# Patient Record
Sex: FEMALE | Race: WHITE | NOT HISPANIC OR LATINO | Employment: OTHER | URBAN - NONMETROPOLITAN AREA
[De-identification: names, ages, dates, MRNs, and addresses within clinical notes are randomized per-mention and may not be internally consistent; named-entity substitution may affect disease eponyms.]

---

## 2021-02-25 ENCOUNTER — IMMUNIZATION (OUTPATIENT)
Dept: OBSTETRICS AND GYNECOLOGY | Facility: CLINIC | Age: 81
End: 2021-02-25
Payer: MEDICARE

## 2021-02-25 DIAGNOSIS — Z23 NEED FOR VACCINATION: Primary | ICD-10-CM

## 2021-02-25 PROCEDURE — 0001A COVID-19, MRNA, LNP-S, PF, 30 MCG/0.3 ML DOSE VACCINE: ICD-10-PCS | Mod: CV19,,, | Performed by: ANESTHESIOLOGY

## 2021-02-25 PROCEDURE — 91300 COVID-19, MRNA, LNP-S, PF, 30 MCG/0.3 ML DOSE VACCINE: CPT | Mod: ,,, | Performed by: ANESTHESIOLOGY

## 2021-02-25 PROCEDURE — 91300 COVID-19, MRNA, LNP-S, PF, 30 MCG/0.3 ML DOSE VACCINE: ICD-10-PCS | Mod: ,,, | Performed by: ANESTHESIOLOGY

## 2021-02-25 PROCEDURE — 0001A COVID-19, MRNA, LNP-S, PF, 30 MCG/0.3 ML DOSE VACCINE: CPT | Mod: CV19,,, | Performed by: ANESTHESIOLOGY

## 2021-03-18 ENCOUNTER — IMMUNIZATION (OUTPATIENT)
Dept: OBSTETRICS AND GYNECOLOGY | Facility: CLINIC | Age: 81
End: 2021-03-18
Payer: MEDICARE

## 2021-03-18 DIAGNOSIS — Z23 NEED FOR VACCINATION: Primary | ICD-10-CM

## 2021-03-18 PROCEDURE — 0002A COVID-19, MRNA, LNP-S, PF, 30 MCG/0.3 ML DOSE VACCINE: CPT | Mod: PBBFAC

## 2021-03-18 PROCEDURE — 91300 COVID-19, MRNA, LNP-S, PF, 30 MCG/0.3 ML DOSE VACCINE: CPT | Mod: PBBFAC

## 2021-09-30 ENCOUNTER — IMMUNIZATION (OUTPATIENT)
Dept: OBSTETRICS AND GYNECOLOGY | Facility: CLINIC | Age: 81
End: 2021-09-30
Payer: MEDICARE

## 2021-09-30 DIAGNOSIS — Z23 NEED FOR VACCINATION: Primary | ICD-10-CM

## 2021-09-30 PROCEDURE — 91300 COVID-19, MRNA, LNP-S, PF, 30 MCG/0.3 ML DOSE VACCINE: CPT | Mod: PBBFAC

## 2022-05-04 DIAGNOSIS — Z95.828 PRESENCE OF PERMANENT CENTRAL VENOUS CATHETER: Primary | ICD-10-CM

## 2022-05-12 PROBLEM — Z95.828: Status: ACTIVE | Noted: 2022-05-12

## 2022-07-14 VITALS — BODY MASS INDEX: 27.49 KG/M2 | HEIGHT: 65 IN | WEIGHT: 165 LBS

## 2022-07-17 PROBLEM — H25.011 CORTICAL AGE-RELATED CATARACT, RIGHT EYE: Status: ACTIVE | Noted: 2022-07-17

## 2022-07-17 PROBLEM — H25.041 POSTERIOR SUBCAPSULAR AGE-RELATED CATARACT, RIGHT EYE: Status: ACTIVE | Noted: 2022-07-17

## 2022-07-17 PROBLEM — H25.11 AGE-RELATED NUCLEAR CATARACT, RIGHT EYE: Status: ACTIVE | Noted: 2022-07-17

## 2022-07-18 ENCOUNTER — ANESTHESIA EVENT (OUTPATIENT)
Dept: SURGERY | Facility: HOSPITAL | Age: 82
End: 2022-07-18
Payer: MEDICARE

## 2022-07-18 ENCOUNTER — HOSPITAL ENCOUNTER (OUTPATIENT)
Dept: PREADMISSION TESTING | Facility: HOSPITAL | Age: 82
Discharge: HOME | End: 2022-07-18
Attending: OPHTHALMOLOGY
Payer: MEDICARE

## 2022-07-18 NOTE — ANESTHESIA PREPROCEDURE EVALUATION
07/18/2022  Sherri Camp is a 81 y.o., female.      Pre-op Assessment    I have reviewed the Patient Summary Reports.    I have reviewed the NPO Status.   I have reviewed the Medications.     Review of Systems  Anesthesia Hx:  No problems with previous Anesthesia  Denies Family Hx of Anesthesia complications.   Denies Personal Hx of Anesthesia complications.   Social:  Non-Smoker    Hematology/Oncology:         -- Cancer in past history: Breast chemotherapy and surgery    Cardiovascular:   Hypertension, well controlled    Pulmonary:  Pulmonary Normal    Renal/:  Renal/ Normal     Hepatic/GI:  Hepatic/GI Normal    Neurological:  Neurology Normal    Endocrine:  Endocrine Normal        Physical Exam  General: Well nourished    Airway:  Mallampati: I / I  Mouth Opening: Normal  TM Distance: Normal  Tongue: Normal  Neck ROM: Normal ROM    Dental:  Dentures    Chest/Lungs:  Clear to auscultation    Heart:  Rate: Normal  Rhythm: Regular Rhythm  Sounds: Normal        Anesthesia Plan  Type of Anesthesia, risks & benefits discussed:    Anesthesia Type: MAC  Intra-op Monitoring Plan: Standard ASA Monitors  Post Op Pain Control Plan: multimodal analgesia  Induction:  IV  Airway Plan: Direct  Informed Consent: Informed consent signed with the Patient and all parties understand the risks and agree with anesthesia plan.  All questions answered.   ASA Score: 3  Day of Surgery Review of History & Physical: I have interviewed and examined the patient. I have reviewed the patient's H&P dated: There are no significant changes.     Ready For Surgery From Anesthesia Perspective.     .

## 2022-07-19 ENCOUNTER — ANESTHESIA (OUTPATIENT)
Dept: SURGERY | Facility: HOSPITAL | Age: 82
End: 2022-07-19
Payer: MEDICARE

## 2022-07-19 ENCOUNTER — HOSPITAL ENCOUNTER (OUTPATIENT)
Facility: HOSPITAL | Age: 82
Discharge: HOME | End: 2022-07-19
Attending: OPHTHALMOLOGY | Admitting: OPHTHALMOLOGY
Payer: MEDICARE

## 2022-07-19 VITALS
RESPIRATION RATE: 20 BRPM | OXYGEN SATURATION: 95 % | DIASTOLIC BLOOD PRESSURE: 66 MMHG | HEART RATE: 57 BPM | TEMPERATURE: 98 F | SYSTOLIC BLOOD PRESSURE: 129 MMHG

## 2022-07-19 DIAGNOSIS — H25.11 AGE-RELATED NUCLEAR CATARACT, RIGHT EYE: ICD-10-CM

## 2022-07-19 PROBLEM — H25.011 CORTICAL AGE-RELATED CATARACT, RIGHT EYE: Status: RESOLVED | Noted: 2022-07-17 | Resolved: 2022-07-19

## 2022-07-19 PROBLEM — H25.041 POSTERIOR SUBCAPSULAR AGE-RELATED CATARACT, RIGHT EYE: Status: RESOLVED | Noted: 2022-07-17 | Resolved: 2022-07-19

## 2022-07-19 PROCEDURE — 37000009 HC ANESTHESIA EA ADD 15 MINS: Performed by: OPHTHALMOLOGY

## 2022-07-19 PROCEDURE — 71000015 HC POSTOP RECOV 1ST HR: Performed by: OPHTHALMOLOGY

## 2022-07-19 PROCEDURE — 25000242 PHARM REV CODE 250 ALT 637 W/ HCPCS: Performed by: ANESTHESIOLOGY

## 2022-07-19 PROCEDURE — 37000008 HC ANESTHESIA 1ST 15 MINUTES: Performed by: OPHTHALMOLOGY

## 2022-07-19 PROCEDURE — 36000706: Performed by: OPHTHALMOLOGY

## 2022-07-19 PROCEDURE — 36000707: Performed by: OPHTHALMOLOGY

## 2022-07-19 PROCEDURE — 25000003 PHARM REV CODE 250: Performed by: OPHTHALMOLOGY

## 2022-07-19 PROCEDURE — V2632 POST CHMBR INTRAOCULAR LENS: HCPCS | Performed by: OPHTHALMOLOGY

## 2022-07-19 PROCEDURE — 63600175 PHARM REV CODE 636 W HCPCS: Performed by: OPHTHALMOLOGY

## 2022-07-19 DEVICE — TECNIS 1-PC CLEAR MONO 6.0MM 20.0D
Type: IMPLANTABLE DEVICE | Site: EYE | Status: FUNCTIONAL
Brand: TECNIS IOL

## 2022-07-19 RX ORDER — MIDAZOLAM HCL 2 MG/ML
4 SYRUP ORAL ONCE
Status: COMPLETED | OUTPATIENT
Start: 2022-07-19 | End: 2022-07-19

## 2022-07-19 RX ORDER — MOXIFLOXACIN 5 MG/ML
1 SOLUTION/ DROPS OPHTHALMIC
Status: COMPLETED | OUTPATIENT
Start: 2022-07-19 | End: 2022-07-19

## 2022-07-19 RX ORDER — LIDOCAINE HYDROCHLORIDE 10 MG/ML
0.5 INJECTION, SOLUTION EPIDURAL; INFILTRATION; INTRACAUDAL; PERINEURAL ONCE
Status: ACTIVE | OUTPATIENT
Start: 2022-07-19

## 2022-07-19 RX ORDER — PROPARACAINE HYDROCHLORIDE 5 MG/ML
1 SOLUTION/ DROPS OPHTHALMIC
Status: COMPLETED | OUTPATIENT
Start: 2022-07-19 | End: 2022-07-19

## 2022-07-19 RX ORDER — MOXIFLOXACIN 5 MG/ML
SOLUTION/ DROPS OPHTHALMIC
Status: DISCONTINUED | OUTPATIENT
Start: 2022-07-19 | End: 2022-07-19 | Stop reason: HOSPADM

## 2022-07-19 RX ORDER — LIDOCAINE HYDROCHLORIDE 10 MG/ML
INJECTION, SOLUTION EPIDURAL; INFILTRATION; INTRACAUDAL; PERINEURAL
Status: DISCONTINUED | OUTPATIENT
Start: 2022-07-19 | End: 2022-07-19 | Stop reason: HOSPADM

## 2022-07-19 RX ORDER — TROPICAMIDE 10 MG/ML
1 SOLUTION/ DROPS OPHTHALMIC
Status: COMPLETED | OUTPATIENT
Start: 2022-07-19 | End: 2022-07-19

## 2022-07-19 RX ORDER — PHENYLEPHRINE HYDROCHLORIDE 25 MG/ML
1 SOLUTION/ DROPS OPHTHALMIC
Status: COMPLETED | OUTPATIENT
Start: 2022-07-19 | End: 2022-07-19

## 2022-07-19 RX ORDER — PREDNISOLONE ACETATE 10 MG/ML
SUSPENSION/ DROPS OPHTHALMIC
Status: DISCONTINUED | OUTPATIENT
Start: 2022-07-19 | End: 2022-07-19 | Stop reason: HOSPADM

## 2022-07-19 RX ORDER — BROMFENAC SODIUM 0.81 MG/ML
SOLUTION/ DROPS OPHTHALMIC
Status: DISCONTINUED | OUTPATIENT
Start: 2022-07-19 | End: 2022-07-19 | Stop reason: HOSPADM

## 2022-07-19 RX ORDER — PROPARACAINE HYDROCHLORIDE 5 MG/ML
SOLUTION/ DROPS OPHTHALMIC
Status: DISCONTINUED | OUTPATIENT
Start: 2022-07-19 | End: 2022-07-19 | Stop reason: HOSPADM

## 2022-07-19 RX ORDER — LIDOCAINE HYDROCHLORIDE 10 MG/ML
1 INJECTION, SOLUTION EPIDURAL; INFILTRATION; INTRACAUDAL; PERINEURAL ONCE
Status: DISPENSED | OUTPATIENT
Start: 2022-07-19

## 2022-07-19 RX ADMIN — PROPARACAINE HYDROCHLORIDE 1 DROP: 5 SOLUTION/ DROPS OPHTHALMIC at 08:07

## 2022-07-19 RX ADMIN — PHENYLEPHRINE HYDROCHLORIDE 1 DROP: 25 SOLUTION/ DROPS OPHTHALMIC at 08:07

## 2022-07-19 RX ADMIN — MOXIFLOXACIN 1 DROP: 5 SOLUTION/ DROPS OPHTHALMIC at 08:07

## 2022-07-19 RX ADMIN — TROPICAMIDE 1 DROP: 10 SOLUTION/ DROPS OPHTHALMIC at 08:07

## 2022-07-19 RX ADMIN — MIDAZOLAM HYDROCHLORIDE 4 MG: 2 SYRUP ORAL at 08:07

## 2022-07-19 NOTE — TRANSFER OF CARE
Anesthesia Transfer of Care Note    Patient: Sherri Camp    Procedure(s) Performed: Procedure(s) (LRB):  PHACOEMULSIFICATION, CATARACT, WITH IOL INSERTION (Right)    Patient location: Other: OR C    Anesthesia Type: MAC    Transport from OR: Transported from OR on room air with adequate spontaneous ventilation    Post pain: adequate analgesia    Post assessment: no apparent anesthetic complications    Post vital signs: stable    Level of consciousness: awake    Nausea/Vomiting: no nausea/vomiting    Complications: none    Transfer of care protocol was followed      Last vitals:   /80  HR 50  RR 16  T 36.7  SPO2 99

## 2022-07-19 NOTE — ANESTHESIA POSTPROCEDURE EVALUATION
Anesthesia Post Evaluation    Patient: Sherri Camp    Procedure(s) Performed: Procedure(s) (LRB):  PHACOEMULSIFICATION, CATARACT, WITH IOL INSERTION (Right)    Final Anesthesia Type: MAC      Patient location during evaluation: OPS  Patient participation: Yes- Able to Participate  Level of consciousness: awake  Post-procedure vital signs: reviewed and stable  Pain management: adequate  Airway patency: patent    PONV status at discharge: No PONV  Anesthetic complications: no      Cardiovascular status: blood pressure returned to baseline  Respiratory status: spontaneous ventilation  Hydration status: euvolemic  Follow-up not needed.          Vitals Value Taken Time   /66 07/19/22 1009   Temp 36.6 °C (97.9 °F) 07/19/22 1009   Pulse 57 07/19/22 1009   Resp 20 07/19/22 1009   SpO2 95 % 07/19/22 1009         No case tracking events are documented in the log.      Pain/Hamzah Score: Hamzah Score: 10 (7/19/2022 10:09 AM)

## 2022-07-19 NOTE — DISCHARGE INSTRUCTIONS
-RELAX AT HOME FOR THE REST OF THE DAY. YOU MAY EAT ANYTHING YOU LIKE.   -PLAN TO SEE DR LANDA TOMORROW AT THE OFFICE. BRING ALL EYE DROPS WITH YOU TO THIS APPOINTMENT.    -PUT EYE DROPS IN THE AFFECTED EYE AS PER DR LANDA'S EYE DROP SCHEDULED. -USE IN ANY ORDER, WAIT 5 MINUTES BETWEEN DROPS.  -REMOVE EYE SHIELD WHILE PUTTING EYE DROPS IN.    -DO NOT RUB YOUR EYE!!  -DO NOT EXERT YOURSELF - NO HEAVY LIFTING, RUNNING OR SWIMMING FOR 1 WEEK.  -YOU MAY SHOWER, BUT DON'T ALLOW WATER INTO YOUR EYE FOR 1 WEEK.  -WEAR PROTECTIVE SUNGLASSES DURING THE DAY AND AN EYE SHIELD AT NIGHT FOR 1 WEEK.    NO DRINKING ALCOHOL OR DRIVING FOR 24 HOURS.    -IF YOU HAVE PAIN, REDNESS OR DECREASED VISION, CALL DR LANDA'S OFFICE -[phone] OR IF AFTER HOURS CALL [phone].

## 2022-07-19 NOTE — DISCHARGE SUMMARY
[facility]  Brief Discharge Note    Patient Name:  Sherri Camp   MRN:  [id number]    :  1940   Admission Date:  2022    Discharge Date:  2022   Attending Physician: Cristhian Kuo MD     Procedure:  PHACOEMULSIFICATION, CATARACT (Right)    OUTCOME: Patient tolerated procedure well without complication and is now ready for discharge.    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                                                       Only remove eye shield to apply eye drops.                                                       Follow the post-op eye instructions given from the clinic.

## 2022-07-19 NOTE — OP NOTE
[facility]  Operative Note    Date:  2022     Patient:  Sherri Camp   MRN:  [id number]   :  1940    Surgeon:  Cristhian uKo MD    PREOPERATIVE DIAGNOSIS:  Visually significant age-related nuclear, cortical, and posterior subcapsular cataract, right eye.    POSTOPERATIVE DIAGNOSIS:  Visually significant age-related nuclear, cortical, and posterior subcapsular cataract, right eye.    PROCEDURE:  Phacoemulsification of cataract with posterior chamber intraocular lens implant, right eye.    ANESTHESIA:  Topical with MAC.      COMPLICATIONS:  None.    ESTIMATED BLOOD LOSS:  Minimal.    PROCEDURE IN DETAIL:  The patient presented to our clinic complaining of increasing difficulties with activities of daily living due to a visually significant cataract in the right eye.  After risks, benefits, and alternatives were explained to the patient, the patient agreed to proceed with the above procedure.  The patient was brought to the operating room and received topical anesthetic to the right eye and was then prepped and draped in the usual sterile fashion.  The right eye was first entered at 11:00 o'clock paracentesis site followed by intracameral lidocaine, and Viscoat.  The primary surgical site was then created at 8:30 o'clock followed by continuous capsulotomy, hydrodissection, and phacoemulsification of the cataract.  Residual cortical material was removed using the automated irrigation-aspiration technique.  Provisc was injected into the posterior chamber and an TAHIR model ZCB00 20.0 diopter lens was placed in the bag without difficulty.  Residual Provisc was removed using the automated irrigation-aspiration technique.  The eye was re-pressurized using BSS solution, and both the paracentesis and primary surgical site were demonstrated to be watertight at the end of the case with Weck-Vivi manipulation.  Vigamox, Pred Forte, and Prolensa were placed in the eye followed by placement of a Dao shield.   The patient tolerated the procedure well and was taken to the recovery room in good and stable condition.  The patient was instructed to refrain from any heavy lifting, bending, stooping, or straining activities and to follow-up in the morning for routine post-operative care with Dr. Cristhian Kuo.

## 2022-09-16 ENCOUNTER — ANESTHESIA EVENT (OUTPATIENT)
Dept: SURGERY | Facility: HOSPITAL | Age: 82
End: 2022-09-16
Payer: MEDICARE

## 2022-09-16 PROBLEM — H25.042 POSTERIOR SUBCAPSULAR AGE-RELATED CATARACT, LEFT EYE: Status: ACTIVE | Noted: 2022-09-16

## 2022-09-16 PROBLEM — H25.12 AGE-RELATED NUCLEAR CATARACT, LEFT EYE: Status: ACTIVE | Noted: 2022-09-16

## 2022-09-16 PROBLEM — H25.012 CORTICAL AGE-RELATED CATARACT, LEFT EYE: Status: ACTIVE | Noted: 2022-09-16

## 2022-09-16 NOTE — ANESTHESIA PREPROCEDURE EVALUATION
09/16/2022  Sherri Camp is a 82 y.o., female.      Pre-op Assessment    I have reviewed the Patient Summary Reports.    I have reviewed the NPO Status.   I have reviewed the Medications.     Review of Systems  Anesthesia Hx:  No problems with previous Anesthesia  Denies Family Hx of Anesthesia complications.   Denies Personal Hx of Anesthesia complications.   Social:  Non-Smoker    Cardiovascular:   Hypertension, well controlled CAD asymptomatic     Pulmonary:  Pulmonary Normal    Renal/:  Renal/ Normal     Hepatic/GI:  Hepatic/GI Normal    Neurological:  Neurology Normal    Endocrine:  Endocrine Normal        Physical Exam  General: Well nourished    Airway:  Mallampati: II / II  Mouth Opening: Normal  TM Distance: Normal  Tongue: Normal  Neck ROM: Normal ROM    Dental:  Dentures    Chest/Lungs:  Clear to auscultation    Heart:  Rate: Normal  Rhythm: Regular Rhythm  Sounds: Normal        Anesthesia Plan  Type of Anesthesia, risks & benefits discussed:    Anesthesia Type: MAC  Intra-op Monitoring Plan: Standard ASA Monitors  Post Op Pain Control Plan: multimodal analgesia  Induction:  IV  Airway Plan: Direct  Informed Consent: Informed consent signed with the Patient and all parties understand the risks and agree with anesthesia plan.  All questions answered.   ASA Score: 3  Day of Surgery Review of History & Physical: I have interviewed and examined the patient. I have reviewed the patient's H&P dated: There are no significant changes.     Ready For Surgery From Anesthesia Perspective.     .

## 2022-09-19 ENCOUNTER — ANESTHESIA (OUTPATIENT)
Dept: SURGERY | Facility: HOSPITAL | Age: 82
End: 2022-09-19
Payer: MEDICARE

## 2022-09-19 ENCOUNTER — HOSPITAL ENCOUNTER (OUTPATIENT)
Facility: HOSPITAL | Age: 82
Discharge: HOME | End: 2022-09-19
Attending: OPHTHALMOLOGY | Admitting: OPHTHALMOLOGY
Payer: MEDICARE

## 2022-09-19 DIAGNOSIS — H25.12 AGE-RELATED NUCLEAR CATARACT, LEFT EYE: ICD-10-CM

## 2022-09-19 PROBLEM — H25.012 CORTICAL AGE-RELATED CATARACT, LEFT EYE: Status: RESOLVED | Noted: 2022-09-16 | Resolved: 2022-09-19

## 2022-09-19 PROBLEM — H25.042 POSTERIOR SUBCAPSULAR AGE-RELATED CATARACT, LEFT EYE: Status: RESOLVED | Noted: 2022-09-16 | Resolved: 2022-09-19

## 2022-09-19 PROCEDURE — 36000707: Performed by: OPHTHALMOLOGY

## 2022-09-19 PROCEDURE — 25000242 PHARM REV CODE 250 ALT 637 W/ HCPCS: Performed by: ANESTHESIOLOGY

## 2022-09-19 PROCEDURE — 37000008 HC ANESTHESIA 1ST 15 MINUTES: Performed by: OPHTHALMOLOGY

## 2022-09-19 PROCEDURE — 37000009 HC ANESTHESIA EA ADD 15 MINS: Performed by: OPHTHALMOLOGY

## 2022-09-19 PROCEDURE — 36000706: Performed by: OPHTHALMOLOGY

## 2022-09-19 PROCEDURE — 63600175 PHARM REV CODE 636 W HCPCS: Performed by: OPHTHALMOLOGY

## 2022-09-19 PROCEDURE — 71000015 HC POSTOP RECOV 1ST HR: Performed by: OPHTHALMOLOGY

## 2022-09-19 PROCEDURE — 25000003 PHARM REV CODE 250: Performed by: OPHTHALMOLOGY

## 2022-09-19 PROCEDURE — V2632 POST CHMBR INTRAOCULAR LENS: HCPCS | Performed by: OPHTHALMOLOGY

## 2022-09-19 DEVICE — TECNIS 1-PC CLEAR MONO 6.0MM 20.0D
Type: IMPLANTABLE DEVICE | Site: EYE | Status: FUNCTIONAL
Brand: TECNIS IOL

## 2022-09-19 RX ORDER — PROPARACAINE HYDROCHLORIDE 5 MG/ML
1 SOLUTION/ DROPS OPHTHALMIC
Status: COMPLETED | OUTPATIENT
Start: 2022-09-19 | End: 2022-09-19

## 2022-09-19 RX ORDER — BROMFENAC SODIUM 0.81 MG/ML
SOLUTION/ DROPS OPHTHALMIC
Status: DISCONTINUED | OUTPATIENT
Start: 2022-09-19 | End: 2022-09-19 | Stop reason: HOSPADM

## 2022-09-19 RX ORDER — LIDOCAINE HYDROCHLORIDE 10 MG/ML
INJECTION, SOLUTION EPIDURAL; INFILTRATION; INTRACAUDAL; PERINEURAL
Status: DISCONTINUED | OUTPATIENT
Start: 2022-09-19 | End: 2022-09-19 | Stop reason: HOSPADM

## 2022-09-19 RX ORDER — PREDNISOLONE ACETATE 10 MG/ML
SUSPENSION/ DROPS OPHTHALMIC
Status: DISCONTINUED | OUTPATIENT
Start: 2022-09-19 | End: 2022-09-19 | Stop reason: HOSPADM

## 2022-09-19 RX ORDER — LIDOCAINE HYDROCHLORIDE 10 MG/ML
0.5 INJECTION, SOLUTION EPIDURAL; INFILTRATION; INTRACAUDAL; PERINEURAL ONCE
Status: DISCONTINUED | OUTPATIENT
Start: 2022-09-19 | End: 2022-09-19 | Stop reason: HOSPADM

## 2022-09-19 RX ORDER — PHENYLEPHRINE HYDROCHLORIDE 25 MG/ML
1 SOLUTION/ DROPS OPHTHALMIC
Status: COMPLETED | OUTPATIENT
Start: 2022-09-19 | End: 2022-09-19

## 2022-09-19 RX ORDER — MOXIFLOXACIN 5 MG/ML
SOLUTION/ DROPS OPHTHALMIC
Status: DISCONTINUED | OUTPATIENT
Start: 2022-09-19 | End: 2022-09-19 | Stop reason: HOSPADM

## 2022-09-19 RX ORDER — LIDOCAINE HYDROCHLORIDE 10 MG/ML
1 INJECTION, SOLUTION EPIDURAL; INFILTRATION; INTRACAUDAL; PERINEURAL ONCE
Status: DISCONTINUED | OUTPATIENT
Start: 2022-09-19 | End: 2022-09-19 | Stop reason: HOSPADM

## 2022-09-19 RX ORDER — PROPARACAINE HYDROCHLORIDE 5 MG/ML
SOLUTION/ DROPS OPHTHALMIC
Status: DISCONTINUED | OUTPATIENT
Start: 2022-09-19 | End: 2022-09-19 | Stop reason: HOSPADM

## 2022-09-19 RX ORDER — MIDAZOLAM HCL 2 MG/ML
4 SYRUP ORAL ONCE
Status: COMPLETED | OUTPATIENT
Start: 2022-09-19 | End: 2022-09-19

## 2022-09-19 RX ORDER — TROPICAMIDE 10 MG/ML
1 SOLUTION/ DROPS OPHTHALMIC
Status: COMPLETED | OUTPATIENT
Start: 2022-09-19 | End: 2022-09-19

## 2022-09-19 RX ORDER — MOXIFLOXACIN 5 MG/ML
1 SOLUTION/ DROPS OPHTHALMIC
Status: COMPLETED | OUTPATIENT
Start: 2022-09-19 | End: 2022-09-19

## 2022-09-19 RX ADMIN — PHENYLEPHRINE HYDROCHLORIDE 1 DROP: 25 SOLUTION/ DROPS OPHTHALMIC at 08:09

## 2022-09-19 RX ADMIN — PROPARACAINE HYDROCHLORIDE 1 DROP: 5 SOLUTION/ DROPS OPHTHALMIC at 08:09

## 2022-09-19 RX ADMIN — PHENYLEPHRINE HYDROCHLORIDE 1 DROP: 25 SOLUTION/ DROPS OPHTHALMIC at 09:09

## 2022-09-19 RX ADMIN — TROPICAMIDE 1 DROP: 10 SOLUTION/ DROPS OPHTHALMIC at 09:09

## 2022-09-19 RX ADMIN — MOXIFLOXACIN 1 DROP: 5 SOLUTION/ DROPS OPHTHALMIC at 09:09

## 2022-09-19 RX ADMIN — TROPICAMIDE 1 DROP: 10 SOLUTION/ DROPS OPHTHALMIC at 08:09

## 2022-09-19 RX ADMIN — MOXIFLOXACIN 1 DROP: 5 SOLUTION/ DROPS OPHTHALMIC at 08:09

## 2022-09-19 RX ADMIN — MIDAZOLAM HYDROCHLORIDE 4 MG: 2 SYRUP ORAL at 09:09

## 2022-09-19 RX ADMIN — PROPARACAINE HYDROCHLORIDE 1 DROP: 5 SOLUTION/ DROPS OPHTHALMIC at 09:09

## 2022-09-19 NOTE — ANESTHESIA POSTPROCEDURE EVALUATION
Anesthesia Post Evaluation    Patient: Sherri Camp    Procedure(s) Performed: Procedure(s) (LRB):  PHACOEMULSIFICATION, CATARACT, WITH IOL INSERTION (Left)    Final Anesthesia Type: MAC      Patient location during evaluation: OPS  Patient participation: Yes- Able to Participate  Level of consciousness: awake  Post-procedure vital signs: reviewed and stable  Pain management: adequate  Airway patency: patent    PONV status at discharge: No PONV  Anesthetic complications: no      Cardiovascular status: blood pressure returned to baseline  Respiratory status: spontaneous ventilation  Hydration status: euvolemic  Follow-up not needed.          Vitals Value Taken Time   /81 09/19/22 1056   Temp 36.7 °C (98 °F) 09/19/22 1056   Pulse 44 09/19/22 1056   Resp 16 09/19/22 1056   SpO2 96 % 09/19/22 1056         No case tracking events are documented in the log.      Pain/Hamzah Score: Hamzah Score: 10 (9/19/2022 10:56 AM)

## 2022-09-19 NOTE — DISCHARGE SUMMARY
[facility]  Brief Discharge Note    Patient Name:  Sherri Camp   MRN:  [id number]    :  1940   Admission Date:  2022   Discharge Date:  2022   Attending Physician: Cristhian Kuo MD     Procedure:  PHACOEMULSIFICATION, CATARACT (Left)    OUTCOME: Patient tolerated procedure well without complication and is now ready for discharge.    DISPOSITION: Home or Self Care    FINAL DIAGNOSIS:  Age-related nuclear cataract, left eye                                     Age-related cortical cataract, left eye                                     Age-related posterior subcapsular cataract, left eye    FOLLOWUP: 1 day in clinic    DISCHARGE INSTRUCTIONS:  Wear eye shield until your schedule appointment with doctor.                                                       Only remove eye shield to apply eye drops.                                                       Follow the post-op eye instructions given from the clinic.

## 2022-09-19 NOTE — DISCHARGE INSTRUCTIONS
-RELAX AT HOME FOR THE REST OF THE DAY. YOU MAY EAT ANYTHING YOU LIKE.   -PLAN TO SEE DR LANDA TOMORROW AT THE OFFICE. BRING ALL EYE DROPS WITH YOU TO THIS APPOINTMENT.    -PUT EYE DROPS IN THE AFFECTED EYE AS PER DR LANDA'S EYE DROP SCHEDULED. -USE IN ANY ORDER, WAIT 5 MINUTES BETWEEN DROPS.  -REMOVE EYE SHIELD WHILE PUTTING EYE DROPS IN.    -DO NOT RUB YOUR EYE!!  -DO NOT EXERT YOURSELF - NO HEAVY LIFTING, RUNNING OR SWIMMING FOR 1 WEEK.  -YOU MAY SHOWER, BUT DON'T ALLOW WATER INTO YOUR EYE FOR 1 WEEK.  -WEAR PROTECTIVE SUNGLASSES DURING THE DAY AND AN EYE SHIELD AT NIGHT FOR 1 WEEK.    NO DRINKING ALCOHOL OR DRIVING FOR 24 HOURS.    -IF YOU HAVE PAIN, REDNESS OR DECREASED VISION, CALL DR LANDA'S OFFICE -[phone] OR IF AFTER HOURS CALL [phone].

## 2022-09-19 NOTE — OP NOTE
[facility]  Operative Note    Date:  2022    Patient:  Sherri Camp  MRN:  [id number]   :  1940    Surgeon:  Cristhian Kuo MD    PREOPERATIVE DIAGNOSIS:  Visually significant age-related nuclear, cortical, and posterior subcapsular cataract, left eye.    POSTOPERATIVE DIAGNOSIS:  Visually significant age-related nuclear, cortical, and posterior subcapsular cataract, left eye.    PROCEDURE:  Phacoemulsification of cataract with posterior chamber intraocular lens implant, left eye.    ANESTHESIA:  Topical with MAC.      COMPLICATIONS:  None.    ESTIMATED BLOOD LOSS:  Minimal.    PROCEDURE IN DETAIL:  The patient presented to our clinic complaining of increasing difficulties with activities of daily living due to a visually significant cataract in the left eye.  After risks, benefits, and alternatives were explained to the patient, the patient agreed to proceed with the above procedure.  The patient was brought to the operating room and received topical anesthetic to the left eye and was then prepped and draped in the usual sterile fashion.  The left eye was first entered at 5:00 o'clock paracentesis site followed by intracameral lidocaine, and Viscoat.  The primary surgical site was then created at 2:30 o'clock followed by continuous capsulotomy, hydrodissection, and phacoemulsification of the cataract.  Residual cortical material was removed using the automated irrigation-aspiration technique.  Provisc was injected into the posterior chamber and an TAHIR model ZCB00 20.0 diopter lens was placed in the bag without difficulty.  Residual Provisc was removed using the automated irrigation-aspiration technique.  The eye was re-pressurized using BSS solution, and both the paracentesis and primary surgical site were demonstrated to be watertight at the end of the case with Weck-Vivi manipulation.  Vigamox, Pred Forte, and Prolensa were placed in the eye followed by placement of a Dao shield.  The  patient tolerated the procedure well and was taken to the recovery room in good and stable condition.  The patient was instructed to refrain from any heavy lifting, bending, stooping, or straining activities and to follow-up in the morning for routine post-operative care with Dr. Cristhian Kuo.

## 2022-09-19 NOTE — TRANSFER OF CARE
Anesthesia Transfer of Care Note    Patient: Sherri Camp    Procedure(s) Performed: Procedure(s) (LRB):  PHACOEMULSIFICATION, CATARACT, WITH IOL INSERTION (Left)    Patient location: Other: OR C    Anesthesia Type: MAC    Transport from OR: Transported from OR on room air with adequate spontaneous ventilation    Post pain: adequate analgesia    Post assessment: no apparent anesthetic complications    Post vital signs: stable    Level of consciousness: awake    Nausea/Vomiting: no nausea/vomiting    Complications: none    Transfer of care protocol was followed      Last vitals:   HR 55  RR 16  SPO2 100  T 36.7  /74

## 2022-09-19 NOTE — INTERVAL H&P NOTE
The patient has been examined and the H&P has been reviewed:    I concur with the findings and no changes have occurred since H&P was written.    Surgery risks, benefits and alternative options discussed and understood by patient/family.          Active Hospital Problems    Diagnosis  POA    *Age-related nuclear cataract, left eye [H25.12]  Yes    Cortical age-related cataract, left eye [H25.012]  Yes    Posterior subcapsular age-related cataract, left eye [H25.042]  Yes      Resolved Hospital Problems   No resolved problems to display.

## 2022-09-22 VITALS
SYSTOLIC BLOOD PRESSURE: 180 MMHG | RESPIRATION RATE: 16 BRPM | HEART RATE: 44 BPM | TEMPERATURE: 98 F | OXYGEN SATURATION: 96 % | DIASTOLIC BLOOD PRESSURE: 81 MMHG

## (undated) DEVICE — SOL IRRI STRL WATER 1000ML

## (undated) DEVICE — ELECTRODE FOAM 535 TEARDROP

## (undated) DEVICE — SYR 50CC LL

## (undated) DEVICE — CANNULA SUPERSOFT CO2 7FT

## (undated) DEVICE — SHEILD EYE PROTCT BLUE FLEX

## (undated) DEVICE — UNDERGLOVE BIOGEL PI SZ 6.5 LF

## (undated) DEVICE — GLOVE BIOGEL ECLIPSE SZ 6.5

## (undated) DEVICE — GLOVE BIOGEL ECLIPSE SZ 7.5

## (undated) DEVICE — UNDERGLOVES BIOGEL PI SZ 7 LF